# Patient Record
Sex: MALE | Race: WHITE | Employment: FULL TIME | URBAN - METROPOLITAN AREA
[De-identification: names, ages, dates, MRNs, and addresses within clinical notes are randomized per-mention and may not be internally consistent; named-entity substitution may affect disease eponyms.]

---

## 2019-01-17 ENCOUNTER — OFFICE VISIT (OUTPATIENT)
Dept: FAMILY MEDICINE CLINIC | Facility: CLINIC | Age: 39
End: 2019-01-17
Payer: COMMERCIAL

## 2019-01-17 VITALS
HEART RATE: 77 BPM | TEMPERATURE: 98 F | WEIGHT: 181 LBS | BODY MASS INDEX: 24.52 KG/M2 | DIASTOLIC BLOOD PRESSURE: 64 MMHG | HEIGHT: 72 IN | OXYGEN SATURATION: 99 % | SYSTOLIC BLOOD PRESSURE: 100 MMHG

## 2019-01-17 DIAGNOSIS — R05.3 CHRONIC COUGH: Primary | ICD-10-CM

## 2019-01-17 DIAGNOSIS — J98.01 POST-INFECTION BRONCHOSPASM: ICD-10-CM

## 2019-01-17 PROCEDURE — 99204 OFFICE O/P NEW MOD 45 MIN: CPT | Performed by: FAMILY MEDICINE

## 2019-01-17 RX ORDER — CEFDINIR 300 MG/1
CAPSULE ORAL
Refills: 0 | COMMUNITY
Start: 2019-01-10 | End: 2019-02-12 | Stop reason: ALTCHOICE

## 2019-01-17 NOTE — PROGRESS NOTES
HPI:   Patient presents with:  Cough: cough x12 weeks      Tye Ernandez is a 41year old male presenting for:    Cough  -for past 3month  -works as   -started getting GUALLPA and cough that initial month  -went to [address] and had normal AST, ALT, BILT, TSH, T4F       Medications:    Current Outpatient Medications:  cefdinir 300 MG Oral Cap TK 1 C PO BID FOR 10 DAYS Disp:  Rfl: 0      History reviewed. No pertinent past medical history. History reviewed. No pertinent surgical history. this encounter.       Imaging & Consults:  None    Health Maintenance:  Annual Physical due on 12/17/1983  Annual Depression Screen due on 12/17/1993  Influenza Vaccine(1) due on 09/01/2018      Vinicio Clements MD

## 2019-01-21 NOTE — PROGRESS NOTES
HPI:   Patient presents with:  Cough: cough x12 weeks      Carisa Cabezas is a 41year old male presenting for:  Cough  -for past 3month  -works as   -started getting GUALLPA and cough went to [address] and had normal CXR 2mo ago and told it Specimen A: Received in formalin, is a piece of skin, measuring 8 mm. Bisected and entirely submitted.  (TS-2-1)              Labs:   No results found for: A1C   No results found for: CHOLEST, HDL, TRIG, LDL, NONHDLC    No results found for: GLU, NA, K, CL, 3 Encounters:  01/17/19 : 181 lb      Physical Exam   Constitutional: He appears well-developed and well-nourished. No distress. HENT:   Nose: Nose normal. Right sinus exhibits no maxillary sinus tenderness and no frontal sinus tenderness.  Left sinus exh any questions, complications, allergies, or worsening or changing symptoms as well as any side effects or complications from the treatments . Red flags/ ER precautions discussed.     Meds & Refills for this Visit:  Requested Prescriptions      No prescript

## 2019-02-04 ENCOUNTER — TELEPHONE (OUTPATIENT)
Dept: FAMILY MEDICINE CLINIC | Facility: CLINIC | Age: 39
End: 2019-02-04

## 2019-02-04 NOTE — TELEPHONE ENCOUNTER
Patient is calling asking for orders for blood work. He wants to get a thyroid,cbc,cmp,vitamin D, and Cholestrol.

## 2019-02-05 ENCOUNTER — TELEPHONE (OUTPATIENT)
Dept: FAMILY MEDICINE CLINIC | Facility: CLINIC | Age: 39
End: 2019-02-05

## 2019-02-05 NOTE — TELEPHONE ENCOUNTER
Called pt yesterday no answer see telephone encounter from 02/04/2019    Called pt informed him he needs an appointment, appt scheduled

## 2019-02-05 NOTE — TELEPHONE ENCOUNTER
Patient is requesting orders for lab work, patient states he spoke to someone and left message but no one call him back.  Please advise

## 2019-02-12 PROCEDURE — 87186 SC STD MICRODIL/AGAR DIL: CPT | Performed by: OTOLARYNGOLOGY

## 2019-02-12 PROCEDURE — 87070 CULTURE OTHR SPECIMN AEROBIC: CPT | Performed by: OTOLARYNGOLOGY

## 2019-02-12 PROCEDURE — 87205 SMEAR GRAM STAIN: CPT | Performed by: OTOLARYNGOLOGY

## 2019-02-12 PROCEDURE — 87077 CULTURE AEROBIC IDENTIFY: CPT | Performed by: OTOLARYNGOLOGY

## 2019-02-13 ENCOUNTER — OFFICE VISIT (OUTPATIENT)
Dept: FAMILY MEDICINE CLINIC | Facility: CLINIC | Age: 39
End: 2019-02-13
Payer: COMMERCIAL

## 2019-02-13 VITALS
BODY MASS INDEX: 24.11 KG/M2 | RESPIRATION RATE: 13 BRPM | SYSTOLIC BLOOD PRESSURE: 124 MMHG | OXYGEN SATURATION: 98 % | WEIGHT: 178 LBS | HEIGHT: 72 IN | DIASTOLIC BLOOD PRESSURE: 80 MMHG | HEART RATE: 76 BPM

## 2019-02-13 DIAGNOSIS — Z13.6 SCREENING FOR CARDIOVASCULAR CONDITION: ICD-10-CM

## 2019-02-13 DIAGNOSIS — Z00.00 HEALTHCARE MAINTENANCE: ICD-10-CM

## 2019-02-13 DIAGNOSIS — Z00.00 WELLNESS EXAMINATION: Primary | ICD-10-CM

## 2019-02-13 DIAGNOSIS — R53.83 FATIGUE, UNSPECIFIED TYPE: ICD-10-CM

## 2019-02-13 PROCEDURE — 99395 PREV VISIT EST AGE 18-39: CPT | Performed by: FAMILY MEDICINE

## 2019-02-13 PROCEDURE — 36415 COLL VENOUS BLD VENIPUNCTURE: CPT | Performed by: FAMILY MEDICINE

## 2019-02-13 NOTE — PROGRESS NOTES
CC: Annual Physical Exam     HPI:   Larry Godinez is a 41year old male who presents for a complete physical exam.     HCM  -Diet: Well-balanced.   -Exercise: regularly  -Mental Health: denies any depression or anxiety sx  -Skin care:  no concerning lesio (calc)    ALBUMIN/GLOBULIN RATIO 1.8 1.0 - 2.5 (calc)    BILIRUBIN, TOTAL 0.5 0.2 - 1.2 mg/dL    ALKALINE PHOSPHATASE 57 40 - 115 U/L    AST 27 10 - 40 U/L    ALT 29 9 - 46 U/L   LIPID PANEL   Result Value Ref Range    CHOLESTEROL, TOTAL 184 <200 mg/dL Mother    • Hypertension Mother    • High Cholesterol Mother    • Other (rheumatic fever) Mother    • Other (mitral prolapse) Mother    • Heart Attack Father 64   • Other (tia) Father    • Hypertension Maternal Grandmother    • Depression Maternal Grandmot Pulse 76   Resp 13   Ht 72\"   Wt 178 lb   SpO2 98%   BMI 24.14 kg/m²  Estimated body mass index is 24.14 kg/m² as calculated from the following:    Height as of this encounter: 72\". Weight as of this encounter: 178 lb. Vital signs reviewed. Appears LIPID PANEL  - TSH W REFLEX TO FREE T4  - URINALYSIS, ROUTINE  - VITAMIN D, 25-HYDROXY      Annual Physical due on 12/17/1983  Annual Depression Screen due on 12/17/1993  Influenza Vaccine(1) due on 09/01/2018      The patient indicates understanding of th

## 2019-02-13 NOTE — PROGRESS NOTES
VITAMIN D,UA,TSH,LIPID,CMP,and CBC labs drawn per Dr Spaulding President orders, Pt tolerated lab draw well

## 2019-02-14 LAB
ALBUMIN/GLOBULIN RATIO: 1.8 (CALC) (ref 1–2.5)
ALBUMIN: 4.8 G/DL (ref 3.6–5.1)
ALKALINE PHOSPHATASE: 57 U/L (ref 40–115)
ALT: 29 U/L (ref 9–46)
APPEARANCE: CLEAR
AST: 27 U/L (ref 10–40)
BILIRUBIN, TOTAL: 0.5 MG/DL (ref 0.2–1.2)
BILIRUBIN: NEGATIVE
BUN: 21 MG/DL (ref 7–25)
CALCIUM: 9.5 MG/DL (ref 8.6–10.3)
CARBON DIOXIDE: 26 MMOL/L (ref 20–32)
CHLORIDE: 101 MMOL/L (ref 98–110)
CHOL/HDLC RATIO: 2.7 (CALC)
CHOLESTEROL, TOTAL: 184 MG/DL
COLOR: YELLOW
CREATININE: 1.25 MG/DL (ref 0.6–1.35)
EGFR IF AFRICN AM: 85 ML/MIN/1.73M2
EGFR IF NONAFRICN AM: 73 ML/MIN/1.73M2
GLOBULIN: 2.6 G/DL (CALC) (ref 1.9–3.7)
GLUCOSE: 86 MG/DL (ref 65–99)
GLUCOSE: NEGATIVE
HDL CHOLESTEROL: 69 MG/DL
HEMATOCRIT: 40 % (ref 38.5–50)
HEMOGLOBIN: 13.6 G/DL (ref 13.2–17.1)
KETONES: NEGATIVE
LDL-CHOLESTEROL: 103 MG/DL (CALC)
LEUKOCYTE ESTERASE: NEGATIVE
MCH: 30.5 PG (ref 27–33)
MCHC: 34 G/DL (ref 32–36)
MCV: 89.7 FL (ref 80–100)
MPV: 10 FL (ref 7.5–12.5)
NITRITE: NEGATIVE
NON-HDL CHOLESTEROL: 115 MG/DL (CALC)
OCCULT BLOOD: NEGATIVE
PH: 6 (ref 5–8)
PLATELET COUNT: 341 THOUSAND/UL (ref 140–400)
POTASSIUM: 3.9 MMOL/L (ref 3.5–5.3)
PROTEIN, TOTAL: 7.4 G/DL (ref 6.1–8.1)
PROTEIN: NEGATIVE
RDW: 12.9 % (ref 11–15)
RED BLOOD CELL COUNT: 4.46 MILLION/UL (ref 4.2–5.8)
SODIUM: 137 MMOL/L (ref 135–146)
SPECIFIC GRAVITY: 1.03 (ref 1–1.03)
TRIGLYCERIDES: 41 MG/DL
TSH W/REFLEX TO FT4: 1.6 MIU/L (ref 0.4–4.5)
VITAMIN D, 25-OH, TOTAL: 62 NG/ML (ref 30–100)
WHITE BLOOD CELL COUNT: 11.9 THOUSAND/UL (ref 3.8–10.8)

## 2019-02-18 ENCOUNTER — TELEPHONE (OUTPATIENT)
Dept: FAMILY MEDICINE CLINIC | Facility: CLINIC | Age: 39
End: 2019-02-18

## 2019-02-18 NOTE — TELEPHONE ENCOUNTER
----- Message from Jossue Ge MD sent at 2/15/2019  1:47 PM CST -----  Please let him know that his labs were overall normal  -Mild WBC elevation but that's due to his sinusitis.  Otherwise his CBC was normal  -CMP with normal electrolytes, li

## 2019-02-18 NOTE — TELEPHONE ENCOUNTER
Pt returned call,  verified, Results below given  Pt requested results be mailed to home address on file  Results mailed

## 2019-03-22 ENCOUNTER — OFFICE VISIT (OUTPATIENT)
Dept: FAMILY MEDICINE CLINIC | Facility: CLINIC | Age: 39
End: 2019-03-22
Payer: COMMERCIAL

## 2019-03-22 VITALS
HEART RATE: 85 BPM | DIASTOLIC BLOOD PRESSURE: 72 MMHG | BODY MASS INDEX: 24.38 KG/M2 | TEMPERATURE: 99 F | WEIGHT: 180 LBS | HEIGHT: 72 IN | SYSTOLIC BLOOD PRESSURE: 118 MMHG | OXYGEN SATURATION: 97 %

## 2019-03-22 DIAGNOSIS — J02.0 STREP PHARYNGITIS: Primary | ICD-10-CM

## 2019-03-22 LAB
CONTROL LINE PRESENT WITH A CLEAR BACKGROUND (YES/NO): YES YES/NO
STREP GRP A CUL-SCR: POSITIVE

## 2019-03-22 PROCEDURE — 87880 STREP A ASSAY W/OPTIC: CPT | Performed by: FAMILY MEDICINE

## 2019-03-22 PROCEDURE — 99213 OFFICE O/P EST LOW 20 MIN: CPT | Performed by: FAMILY MEDICINE

## 2019-03-22 RX ORDER — AMOXICILLIN 500 MG/1
500 CAPSULE ORAL 2 TIMES DAILY
Qty: 20 CAPSULE | Refills: 0 | Status: SHIPPED | OUTPATIENT
Start: 2019-03-22 | End: 2019-04-01

## 2019-03-24 NOTE — PROGRESS NOTES
HPI:   Patient presents with:  URI: sore throat, body aches, slight fever since last night pt states his son has strep      Ashwin Jack is a 41year old male presenting for:    Sore throat  -for past 2d having sore throat and subjective f/c  -son dx w caused             reaction.   Meperidine              OTHER (SEE COMMENTS)   Social History:  Social History    Tobacco Use      Smoking status: Never Smoker      Smokeless tobacco: Never Used    Alcohol use: No      Frequency: Never    Drug use: Not on fi from Last 3 Encounters:  03/22/19 : 180 lb  02/13/19 : 178 lb  01/23/19 : 174 lb      Physical Exam   Constitutional: He appears well-developed and well-nourished. No distress.    HENT:   Mouth/Throat: Oropharyngeal exudate, posterior oropharyngeal edema (m this Visit:  Requested Prescriptions     Signed Prescriptions Disp Refills   • amoxicillin 500 MG Oral Cap 20 capsule 0     Sig: Take 1 capsule (500 mg total) by mouth 2 (two) times daily for 10 days.        Orders Placed This Encounter      POC Rapid Strep

## 2019-04-16 ENCOUNTER — OFFICE VISIT (OUTPATIENT)
Dept: FAMILY MEDICINE CLINIC | Facility: CLINIC | Age: 39
End: 2019-04-16
Payer: COMMERCIAL

## 2019-04-16 VITALS
DIASTOLIC BLOOD PRESSURE: 78 MMHG | OXYGEN SATURATION: 98 % | WEIGHT: 182 LBS | HEIGHT: 72 IN | RESPIRATION RATE: 12 BRPM | HEART RATE: 75 BPM | SYSTOLIC BLOOD PRESSURE: 114 MMHG | BODY MASS INDEX: 24.65 KG/M2

## 2019-04-16 DIAGNOSIS — L30.9 DERMATITIS: Primary | ICD-10-CM

## 2019-04-16 PROCEDURE — 99213 OFFICE O/P EST LOW 20 MIN: CPT | Performed by: FAMILY MEDICINE

## 2019-04-16 RX ORDER — CLOTRIMAZOLE AND BETAMETHASONE DIPROPIONATE 10; .64 MG/G; MG/G
1 CREAM TOPICAL 2 TIMES DAILY
Qty: 15 G | Refills: 0 | Status: SHIPPED | OUTPATIENT
Start: 2019-04-16 | End: 2019-12-05

## 2019-04-16 NOTE — PROGRESS NOTES
HPI:   Patient presents with:  Derm Problem: bilateral thigh rash x3 weeks      Dorothy Wolfe is a 41year old male presenting for:    Rash  -for past 2-3 wks having rash in inner thigh on b/l leg  -not spreading  -not itchy, burning, painful  -putting reaction but             never knew which one was the one that caused             reaction.   Meperidine              OTHER (SEE COMMENTS)   Social History:  Social History    Tobacco Use      Smoking status: Never Smoker      Smokeless tobacco: Never Used : 182 lb  03/22/19 : 180 lb  02/13/19 : 178 lb      Physical Exam   Constitutional: He appears well-developed and well-nourished. No distress. HENT:   Mouth/Throat: Oropharynx is clear and moist.   Eyes: Pupils are equal, round, and reactive to light.  Co Cristin Mccabe MD

## 2019-12-05 ENCOUNTER — OFFICE VISIT (OUTPATIENT)
Dept: FAMILY MEDICINE CLINIC | Facility: CLINIC | Age: 39
End: 2019-12-05
Payer: COMMERCIAL

## 2019-12-05 ENCOUNTER — TELEPHONE (OUTPATIENT)
Dept: FAMILY MEDICINE CLINIC | Facility: CLINIC | Age: 39
End: 2019-12-05

## 2019-12-05 VITALS
DIASTOLIC BLOOD PRESSURE: 64 MMHG | OXYGEN SATURATION: 98 % | BODY MASS INDEX: 24.92 KG/M2 | WEIGHT: 184 LBS | RESPIRATION RATE: 13 BRPM | HEIGHT: 72 IN | HEART RATE: 74 BPM | SYSTOLIC BLOOD PRESSURE: 100 MMHG

## 2019-12-05 DIAGNOSIS — N48.89 PENILE IRRITATION: Primary | ICD-10-CM

## 2019-12-05 PROCEDURE — 99213 OFFICE O/P EST LOW 20 MIN: CPT | Performed by: FAMILY MEDICINE

## 2019-12-05 PROCEDURE — 81003 URINALYSIS AUTO W/O SCOPE: CPT | Performed by: FAMILY MEDICINE

## 2019-12-05 RX ORDER — CLOTRIMAZOLE 1 %
1 CREAM (GRAM) TOPICAL 2 TIMES DAILY
Qty: 14 G | Refills: 0 | Status: SHIPPED | OUTPATIENT
Start: 2019-12-05 | End: 2021-02-15 | Stop reason: ALTCHOICE

## 2019-12-05 NOTE — TELEPHONE ENCOUNTER
Open slot available with Dr. Jaxson Barton today around noon. Called and left vm for patient to call office back to see if he would like to take the open slot.

## 2019-12-05 NOTE — PROGRESS NOTES
HPI:   Patient presents with:  Pain: pain in genital area since September      Donna Fitzgeradl is a 46year old male presenting for: For the past 3mo having pain in penis.  No rashes, skin changes, swelling, discharge, bulging  Years ago had \"sprained 02/13/2019 01:33 PM          Medications:  Current Outpatient Medications   Medication Sig Dispense Refill   • clotrimazole 1 % External Cream Apply 1 drop topically 2 (two) times daily. 14 g 0      No past medical history on file.       Past Surgical Histo shortness of breath. Cardiovascular: Negative for chest pain, palpitations and leg swelling. Gastrointestinal: Negative for vomiting, abdominal pain, diarrhea and constipation. Genitourinary: Positive for penile pain.  Negative for discharge, penile Patient is a 46year old male who presents primarily presents for:    Diagnoses and all orders for this visit:    Penile irritation  -     URINALYSIS, AUTO, W/O SCOPE  -     CHLAMYDIA/GONOCOCCUS, JOBY  -     clotrimazole 1 % External Cream; Apply 1 drop t

## 2019-12-05 NOTE — TELEPHONE ENCOUNTER
Pt called requesting appt for today or tomorrow, he stated that he has pain in his pinus and would like to get that checked or if he needs to see a urologist to please recommend one.   Please call and advise

## 2019-12-10 ENCOUNTER — TELEPHONE (OUTPATIENT)
Dept: FAMILY MEDICINE CLINIC | Facility: CLINIC | Age: 39
End: 2019-12-10

## 2019-12-10 NOTE — TELEPHONE ENCOUNTER
----- Message from Chris Shearer MD sent at 12/6/2019  2:33 PM CST -----  Please let him know that the test was neg

## 2020-03-16 ENCOUNTER — TELEPHONE (OUTPATIENT)
Dept: FAMILY MEDICINE CLINIC | Facility: CLINIC | Age: 40
End: 2020-03-16

## 2020-03-16 NOTE — TELEPHONE ENCOUNTER
Spoke to patient to discuss symptoms. 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 Cough started last night, more of dry cough.   C/o pressure on chest, feels weight a

## 2020-03-16 NOTE — TELEPHONE ENCOUNTER
Pt called stating that since yesterday he has occasional cough/dry cough, felt tiered and has been feeling like pressure in his chest and has problem breathing. Pt has no temp or sore throat. Has not traveled in the past couple of months.   Please call and

## 2020-03-16 NOTE — TELEPHONE ENCOUNTER
Left detailed voicemail. Advised patient to obtain OTC medications such as Mucinex D or DM, or Robitussin DM, Loratadine D and Flonase to help his symptoms.   If having trouble obtaining these medications to call office back and we will send a RX for him

## 2020-03-19 ENCOUNTER — TELEPHONE (OUTPATIENT)
Dept: INTERNAL MEDICINE CLINIC | Facility: CLINIC | Age: 40
End: 2020-03-19

## 2020-03-19 NOTE — TELEPHONE ENCOUNTER
Pt called c/o chest pressure- constant for a few days. Nonexertional. No fevers. No cough. ? Anxiety, closed on a house today. No cardiac risk factors. D/w pt could be muscle/ vs gerd/ vs pleuritic. If CP or SOB or fevers, call us back.   Otherwise stay h

## 2020-07-13 ENCOUNTER — TELEPHONE (OUTPATIENT)
Dept: FAMILY MEDICINE CLINIC | Facility: CLINIC | Age: 40
End: 2020-07-13

## 2020-07-13 NOTE — TELEPHONE ENCOUNTER
Patient is calling stating he has a bump that Is forming on his right tricep. Wants to know if he should make an candie to be check with Dr. Jose Love or dermatologist .please advice.

## 2020-07-13 NOTE — TELEPHONE ENCOUNTER
Spoke to patient. Symptoms began about a week ago. Right arm, tricep -- feels that there is a bump under the skin. C/o mild pain, and \"little irritated\" -- denies redness or warm to touch.   Reports that there is \"no head\" -- thus essentially nothin

## 2021-02-01 ENCOUNTER — TELEPHONE (OUTPATIENT)
Dept: FAMILY MEDICINE CLINIC | Facility: CLINIC | Age: 41
End: 2021-02-01

## 2021-02-01 NOTE — TELEPHONE ENCOUNTER
On call note    Pt reports having chest pain intermittently for past couple of weeks. Sometimes associated with nausea, palpitations. No SOB  Has been more anxious/stressed lately due to [place].  Has had confrontations at work with customers/clients refusi

## 2021-02-03 ENCOUNTER — ORDER TRANSCRIPTION (OUTPATIENT)
Dept: ADMINISTRATIVE | Facility: HOSPITAL | Age: 41
End: 2021-02-03

## 2021-02-03 ENCOUNTER — TELEPHONE (OUTPATIENT)
Dept: INTERNAL MEDICINE CLINIC | Facility: CLINIC | Age: 41
End: 2021-02-03

## 2021-02-03 DIAGNOSIS — Z13.9 ENCOUNTER FOR SCREENING: Primary | ICD-10-CM

## 2021-02-03 NOTE — TELEPHONE ENCOUNTER
Spoke to patient, I informed him per Dr Lissette Mota notes he will be having an EKG and labs done when he comes in on 02/15/2021.  Per patient he wants to have a heart scan done at [facility] he states its is $75, he will call if he needs an order to be entered,

## 2021-02-11 ENCOUNTER — HOSPITAL ENCOUNTER (OUTPATIENT)
Dept: CT IMAGING | Age: 41
Discharge: HOME | End: 2021-02-11
Attending: FAMILY MEDICINE

## 2021-02-11 DIAGNOSIS — Z13.9 ENCOUNTER FOR SCREENING: ICD-10-CM

## 2021-02-15 ENCOUNTER — OFFICE VISIT (OUTPATIENT)
Dept: FAMILY MEDICINE CLINIC | Facility: CLINIC | Age: 41
End: 2021-02-15
Payer: COMMERCIAL

## 2021-02-15 ENCOUNTER — LAB ENCOUNTER (OUTPATIENT)
Dept: LAB | Facility: HOSPITAL | Age: 41
End: 2021-02-15
Attending: FAMILY MEDICINE
Payer: COMMERCIAL

## 2021-02-15 VITALS
HEART RATE: 85 BPM | DIASTOLIC BLOOD PRESSURE: 68 MMHG | BODY MASS INDEX: 24.92 KG/M2 | HEIGHT: 72 IN | WEIGHT: 184 LBS | SYSTOLIC BLOOD PRESSURE: 110 MMHG | OXYGEN SATURATION: 98 %

## 2021-02-15 DIAGNOSIS — Z00.00 HEALTHCARE MAINTENANCE: ICD-10-CM

## 2021-02-15 DIAGNOSIS — R00.2 PALPITATION: ICD-10-CM

## 2021-02-15 DIAGNOSIS — R00.2 PALPITATION: Primary | ICD-10-CM

## 2021-02-15 DIAGNOSIS — S76.212A INGUINAL STRAIN, LEFT, INITIAL ENCOUNTER: ICD-10-CM

## 2021-02-15 PROCEDURE — 3008F BODY MASS INDEX DOCD: CPT | Performed by: FAMILY MEDICINE

## 2021-02-15 PROCEDURE — 3078F DIAST BP <80 MM HG: CPT | Performed by: FAMILY MEDICINE

## 2021-02-15 PROCEDURE — 36415 COLL VENOUS BLD VENIPUNCTURE: CPT | Performed by: FAMILY MEDICINE

## 2021-02-15 PROCEDURE — 99215 OFFICE O/P EST HI 40 MIN: CPT | Performed by: FAMILY MEDICINE

## 2021-02-15 PROCEDURE — 3074F SYST BP LT 130 MM HG: CPT | Performed by: FAMILY MEDICINE

## 2021-02-15 PROCEDURE — 93010 ELECTROCARDIOGRAM REPORT: CPT | Performed by: FAMILY MEDICINE

## 2021-02-15 PROCEDURE — 93005 ELECTROCARDIOGRAM TRACING: CPT

## 2021-02-16 LAB
ABSOLUTE BASOPHILS: 33 CELLS/UL (ref 0–200)
ABSOLUTE EOSINOPHILS: 59 CELLS/UL (ref 15–500)
ABSOLUTE LYMPHOCYTES: 1898 CELLS/UL (ref 850–3900)
ABSOLUTE MONOCYTES: 559 CELLS/UL (ref 200–950)
ABSOLUTE NEUTROPHILS: 3952 CELLS/UL (ref 1500–7800)
ALBUMIN/GLOBULIN RATIO: 1.9 (CALC) (ref 1–2.5)
ALBUMIN: 4.6 G/DL (ref 3.6–5.1)
ALKALINE PHOSPHATASE: 54 U/L (ref 36–130)
ALT: 22 U/L (ref 9–46)
AST: 26 U/L (ref 10–40)
BASOPHILS: 0.5 %
BILIRUBIN, TOTAL: 0.4 MG/DL (ref 0.2–1.2)
BUN: 22 MG/DL (ref 7–25)
CALCIUM: 9.4 MG/DL (ref 8.6–10.3)
CARBON DIOXIDE: 29 MMOL/L (ref 20–32)
CHLORIDE: 104 MMOL/L (ref 98–110)
CHOL/HDLC RATIO: 3.3 (CALC)
CHOLESTEROL, TOTAL: 187 MG/DL
CREATININE: 1.16 MG/DL (ref 0.6–1.35)
EGFR IF AFRICN AM: 91 ML/MIN/1.73M2
EGFR IF NONAFRICN AM: 79 ML/MIN/1.73M2
EOSINOPHILS: 0.9 %
GLOBULIN: 2.4 G/DL (CALC) (ref 1.9–3.7)
GLUCOSE: 85 MG/DL (ref 65–99)
HDL CHOLESTEROL: 57 MG/DL
HEMATOCRIT: 39.5 % (ref 38.5–50)
HEMOGLOBIN: 13.2 G/DL (ref 13.2–17.1)
LDL-CHOLESTEROL: 115 MG/DL (CALC)
LYMPHOCYTES: 29.2 %
MCH: 30.4 PG (ref 27–33)
MCHC: 33.4 G/DL (ref 32–36)
MCV: 91 FL (ref 80–100)
MONOCYTES: 8.6 %
MPV: 9.9 FL (ref 7.5–12.5)
NEUTROPHILS: 60.8 %
NON-HDL CHOLESTEROL: 130 MG/DL (CALC)
PLATELET COUNT: 271 THOUSAND/UL (ref 140–400)
POTASSIUM: 3.9 MMOL/L (ref 3.5–5.3)
PROTEIN, TOTAL: 7 G/DL (ref 6.1–8.1)
RDW: 11.4 % (ref 11–15)
RED BLOOD CELL COUNT: 4.34 MILLION/UL (ref 4.2–5.8)
SODIUM: 141 MMOL/L (ref 135–146)
TRIGLYCERIDES: 49 MG/DL
TSH W/REFLEX TO FT4: 1.71 MIU/L (ref 0.4–4.5)
WHITE BLOOD CELL COUNT: 6.5 THOUSAND/UL (ref 3.8–10.8)

## 2021-02-17 ENCOUNTER — TELEPHONE (OUTPATIENT)
Dept: FAMILY MEDICINE CLINIC | Facility: CLINIC | Age: 41
End: 2021-02-17

## 2021-02-17 NOTE — TELEPHONE ENCOUNTER
----- Message from Aure Troncoso MD sent at 2/16/2021  8:30 AM CST -----  Please let him know his bloodwork (thryoid, glucose, blood count, kidney, liver, electrolytes) and EKG are normal  Your cholesterol panel is minimally elevated.  This is re

## 2021-02-20 NOTE — PROGRESS NOTES
HPI:   Patient presents with:  Chest Pain      Jo Pineda is a 45year old male presenting for:    chest pain intermittently for past couple of weeks. Sometimes associated with nausea, palpitations.   No SOB  Has been more anxious/stressed lately due (calc)    ALBUMIN/GLOBULIN RATIO 1.9 1.0 - 2.5 (calc)    BILIRUBIN, TOTAL 0.4 0.2 - 1.2 mg/dL    ALKALINE PHOSPHATASE 54 36 - 130 U/L    AST 26 10 - 40 U/L    ALT 22 9 - 46 U/L   LIPID PANEL   Result Value Ref Range    CHOLESTEROL, TOTAL 187 <200 mg/dL Never Used    Alcohol use: No      Frequency: Never    Drug use: Not on file     Family History:  Family History   Problem Relation Age of Onset   • Thyroid disease Mother    • Hypertension Mother    • High Cholesterol Mother    • Other (rheumatic fever) M kg)  12/05/19 : 184 lb (83.5 kg)  04/16/19 : 182 lb (82.6 kg)      Physical Exam   Vitals reviewed. Constitutional: He appears well-developed. No distress.    Eyes: Conjunctivae are normal.   Cardiovascular: Normal rate, regular rhythm and normal heart so complications from the treatments . Red flags/ ER precautions discussed.     Spent 40 minutes including chart review, reviewing appropriate medical history, evaluating patient, discussing treatment options, counseling and education  ordering appropriate di

## 2021-06-01 ENCOUNTER — TELEPHONE (OUTPATIENT)
Dept: INTERNAL MEDICINE CLINIC | Facility: CLINIC | Age: 41
End: 2021-06-01

## 2021-06-02 ENCOUNTER — APPOINTMENT (OUTPATIENT)
Dept: ULTRASOUND IMAGING | Facility: HOSPITAL | Age: 41
End: 2021-06-02
Attending: EMERGENCY MEDICINE
Payer: COMMERCIAL

## 2021-06-02 ENCOUNTER — HOSPITAL ENCOUNTER (EMERGENCY)
Facility: HOSPITAL | Age: 41
Discharge: HOME | End: 2021-06-02
Attending: EMERGENCY MEDICINE
Payer: COMMERCIAL

## 2021-06-02 ENCOUNTER — APPOINTMENT (OUTPATIENT)
Dept: MRI IMAGING | Facility: HOSPITAL | Age: 41
End: 2021-06-02
Attending: EMERGENCY MEDICINE
Payer: COMMERCIAL

## 2021-06-02 VITALS
HEART RATE: 70 BPM | TEMPERATURE: 97 F | SYSTOLIC BLOOD PRESSURE: 118 MMHG | HEIGHT: 72 IN | WEIGHT: 180 LBS | DIASTOLIC BLOOD PRESSURE: 78 MMHG | RESPIRATION RATE: 16 BRPM | OXYGEN SATURATION: 97 % | BODY MASS INDEX: 24.38 KG/M2

## 2021-06-02 DIAGNOSIS — R10.9 ABDOMINAL PAIN OF UNKNOWN ETIOLOGY: Primary | ICD-10-CM

## 2021-06-02 PROCEDURE — 85025 COMPLETE CBC W/AUTO DIFF WBC: CPT

## 2021-06-02 PROCEDURE — 76705 ECHO EXAM OF ABDOMEN: CPT | Performed by: EMERGENCY MEDICINE

## 2021-06-02 PROCEDURE — 81003 URINALYSIS AUTO W/O SCOPE: CPT | Performed by: EMERGENCY MEDICINE

## 2021-06-02 PROCEDURE — 72195 MRI PELVIS W/O DYE: CPT | Performed by: EMERGENCY MEDICINE

## 2021-06-02 PROCEDURE — 83690 ASSAY OF LIPASE: CPT | Performed by: EMERGENCY MEDICINE

## 2021-06-02 PROCEDURE — 80076 HEPATIC FUNCTION PANEL: CPT | Performed by: EMERGENCY MEDICINE

## 2021-06-02 PROCEDURE — 96361 HYDRATE IV INFUSION ADD-ON: CPT

## 2021-06-02 PROCEDURE — 81003 URINALYSIS AUTO W/O SCOPE: CPT

## 2021-06-02 PROCEDURE — 85025 COMPLETE CBC W/AUTO DIFF WBC: CPT | Performed by: EMERGENCY MEDICINE

## 2021-06-02 PROCEDURE — 80048 BASIC METABOLIC PNL TOTAL CA: CPT | Performed by: EMERGENCY MEDICINE

## 2021-06-02 PROCEDURE — 80048 BASIC METABOLIC PNL TOTAL CA: CPT

## 2021-06-02 PROCEDURE — 99285 EMERGENCY DEPT VISIT HI MDM: CPT

## 2021-06-02 PROCEDURE — 96360 HYDRATION IV INFUSION INIT: CPT

## 2021-06-02 RX ORDER — DICYCLOMINE HCL 20 MG
20 TABLET ORAL 4 TIMES DAILY PRN
Qty: 30 TABLET | Refills: 0 | Status: SHIPPED | OUTPATIENT
Start: 2021-06-02 | End: 2021-07-02

## 2021-06-02 NOTE — TELEPHONE ENCOUNTER
Pt called. C/o some pain periumbilical/ RLQ x 6 days. Sometimes dull ache, gets up to 5/10 at most. No sharp pains. No vomiting. No nausea. Appetite ok. Very mild loose stools but also possible constipation.  Today also noted some R low back pain but was pl

## 2021-06-02 NOTE — ED INITIAL ASSESSMENT (HPI)
Patient concerned for appendicitis. He requests if he does need imaging to avoid CT scan and have ultrasound instead to avoid radiation exposure.

## 2021-06-03 NOTE — ED QUICK NOTES
Discharge instructions reviewed with pt. Pt denies any further questions at this time. Pt stable with a steady gait at time of discharge.

## 2021-06-03 NOTE — ED PROVIDER NOTES
Patient Seen in: [facility] AND [facility] Emergency Department      History   Patient presents with:  Abdomen/Flank Pain    Stated Complaint:     HPI/Subjective:   HPI    Patient presents to the emergency department complaining of right-sided periumbilical abdo Pulmonary:      Effort: Pulmonary effort is normal. No respiratory distress. Breath sounds: Normal breath sounds. Abdominal:      General: There is no distension. Palpations: Abdomen is soft. Tenderness:  There is abdominal tenderness i 96%, Normal,     Cardiac Monitor: Pulse Readings from Last 1 Encounters:  06/02/21 : 70  , sinus,      Radiology findings: US GALLBLADDER (AVC=[id number])    Result Date: 6/2/2021  CONCLUSION:  1. Normal gallbladder ultrasound.     Dictated by (CST): Westley Baxter

## 2021-06-03 NOTE — TELEPHONE ENCOUNTER
Left voicemail for patient. Calling to follow-up on his condition. To call office back to discuss and/or make an appointment.

## 2021-06-30 ENCOUNTER — HOSPITAL ENCOUNTER (EMERGENCY)
Facility: HOSPITAL | Age: 41
Discharge: HOME | End: 2021-06-30
Attending: EMERGENCY MEDICINE
Payer: COMMERCIAL

## 2021-06-30 ENCOUNTER — APPOINTMENT (OUTPATIENT)
Dept: GENERAL RADIOLOGY | Facility: HOSPITAL | Age: 41
End: 2021-06-30
Payer: COMMERCIAL

## 2021-06-30 VITALS
OXYGEN SATURATION: 97 % | HEIGHT: 72 IN | BODY MASS INDEX: 24.38 KG/M2 | RESPIRATION RATE: 15 BRPM | SYSTOLIC BLOOD PRESSURE: 107 MMHG | DIASTOLIC BLOOD PRESSURE: 66 MMHG | HEART RATE: 61 BPM | WEIGHT: 180 LBS

## 2021-06-30 DIAGNOSIS — R07.89 CHEST PAIN, ATYPICAL: ICD-10-CM

## 2021-06-30 DIAGNOSIS — R07.89 CHEST WALL PAIN: Primary | ICD-10-CM

## 2021-06-30 DIAGNOSIS — R00.2 PALPITATIONS: ICD-10-CM

## 2021-06-30 LAB
ANION GAP SERPL CALC-SCNC: 5 MMOL/L (ref 0–18)
BASOPHILS # BLD AUTO: 0.02 X10(3) UL (ref 0–0.2)
BASOPHILS NFR BLD AUTO: 0.3 %
BUN BLD-MCNC: 22 MG/DL (ref 7–18)
BUN/CREAT SERPL: 20 (ref 10–20)
CALCIUM BLD-MCNC: 8.9 MG/DL (ref 8.5–10.1)
CHLORIDE SERPL-SCNC: 106 MMOL/L (ref 98–112)
CO2 SERPL-SCNC: 31 MMOL/L (ref 21–32)
CREAT BLD-MCNC: 1.1 MG/DL
D DIMER PPP FEU-MCNC: <0.27 UG/ML FEU (ref ?–0.5)
DEPRECATED RDW RBC AUTO: 41.2 FL (ref 35.1–46.3)
EOSINOPHIL # BLD AUTO: 0.1 X10(3) UL (ref 0–0.7)
EOSINOPHIL NFR BLD AUTO: 1.4 %
ERYTHROCYTE [DISTWIDTH] IN BLOOD BY AUTOMATED COUNT: 12 % (ref 11–15)
GLUCOSE BLD-MCNC: 103 MG/DL (ref 70–99)
HCT VFR BLD AUTO: 43 %
HGB BLD-MCNC: 14 G/DL
IMM GRANULOCYTES # BLD AUTO: 0.02 X10(3) UL (ref 0–1)
IMM GRANULOCYTES NFR BLD: 0.3 %
LYMPHOCYTES # BLD AUTO: 2.18 X10(3) UL (ref 1–4)
LYMPHOCYTES NFR BLD AUTO: 29.5 %
MCH RBC QN AUTO: 30.3 PG (ref 26–34)
MCHC RBC AUTO-ENTMCNC: 32.6 G/DL (ref 31–37)
MCV RBC AUTO: 93.1 FL
MONOCYTES # BLD AUTO: 0.61 X10(3) UL (ref 0.1–1)
MONOCYTES NFR BLD AUTO: 8.2 %
NEUTROPHILS # BLD AUTO: 4.47 X10 (3) UL (ref 1.5–7.7)
NEUTROPHILS # BLD AUTO: 4.47 X10(3) UL (ref 1.5–7.7)
NEUTROPHILS NFR BLD AUTO: 60.3 %
OSMOLALITY SERPL CALC.SUM OF ELEC: 298 MOSM/KG (ref 275–295)
PLATELET # BLD AUTO: 307 10(3)UL (ref 150–450)
POTASSIUM SERPL-SCNC: 3.8 MMOL/L (ref 3.5–5.1)
RBC # BLD AUTO: 4.62 X10(6)UL
SODIUM SERPL-SCNC: 142 MMOL/L (ref 136–145)
TROPONIN I SERPL-MCNC: <0.045 NG/ML (ref ?–0.04)
WBC # BLD AUTO: 7.4 X10(3) UL (ref 4–11)

## 2021-06-30 PROCEDURE — 36415 COLL VENOUS BLD VENIPUNCTURE: CPT

## 2021-06-30 PROCEDURE — 85025 COMPLETE CBC W/AUTO DIFF WBC: CPT | Performed by: EMERGENCY MEDICINE

## 2021-06-30 PROCEDURE — 84484 ASSAY OF TROPONIN QUANT: CPT | Performed by: EMERGENCY MEDICINE

## 2021-06-30 PROCEDURE — 93005 ELECTROCARDIOGRAM TRACING: CPT

## 2021-06-30 PROCEDURE — 85379 FIBRIN DEGRADATION QUANT: CPT | Performed by: EMERGENCY MEDICINE

## 2021-06-30 PROCEDURE — 71045 X-RAY EXAM CHEST 1 VIEW: CPT | Performed by: EMERGENCY MEDICINE

## 2021-06-30 PROCEDURE — 93010 ELECTROCARDIOGRAM REPORT: CPT | Performed by: EMERGENCY MEDICINE

## 2021-06-30 PROCEDURE — 99284 EMERGENCY DEPT VISIT MOD MDM: CPT

## 2021-06-30 PROCEDURE — 80048 BASIC METABOLIC PNL TOTAL CA: CPT | Performed by: EMERGENCY MEDICINE

## 2021-06-30 NOTE — ED INITIAL ASSESSMENT (HPI)
Add hydralazine 25 mg q8h prn sys >170; continue home regimen otherwise     Pt presents to ED for chest discomfort since Saturday. Pt also notes an episode of tachycardia of 140 yesterday. Pt denies shortness of breath. Pt denies fevers.

## 2021-06-30 NOTE — ED PROVIDER NOTES
Patient Seen in: [facility] AND [facility] Emergency Department      History   Patient presents with:  Chest Pain Angina    Stated Complaint: chest discomfort     HPI/Subjective:   HPI    28-year-old healthy  vasculatures evaluation of some right Normal rate, regular rhythm and intact and equal distal pulses. No noted murmur  Pulmonary/Chest: Effort normal. No respiratory distress. Clear equal breath sounds bilaterally  Abdominal: Soft. There is no tenderness. There is no guarding.    Musculoskele osseous structures appear intact      Report faxed directly to ER at 4:49 Jose Juan Roblero M.D. This report has been electronically signed and verified by the Radiologist whose name is printed above.     DD:  06/30/2021/DT:  06/30/2021           MDM

## 2021-07-01 ENCOUNTER — TELEPHONE (OUTPATIENT)
Dept: FAMILY MEDICINE CLINIC | Facility: CLINIC | Age: 41
End: 2021-07-01

## 2021-07-01 NOTE — TELEPHONE ENCOUNTER
Patient wants to speak to the nurse or to the doctor, patient is complaining of chest pain his symptoms haven't improve since he was in the ED. Please see Ed notes thank you.

## 2021-07-01 NOTE — TELEPHONE ENCOUNTER
Spoke to patient. Doing okay. Was advised to take antiinflammatories in the ED, but per pt report, he has not been doing so. He has a scheduled procedure on Thurs (EGD?).   Discussed that tests from the ER were reassuring; to try antiinflammatories in t

## 2024-01-26 ENCOUNTER — OFFICE VISIT (OUTPATIENT)
Dept: INTERNAL MEDICINE CLINIC | Facility: CLINIC | Age: 44
End: 2024-01-26
Payer: COMMERCIAL

## 2024-01-26 ENCOUNTER — LAB ENCOUNTER (OUTPATIENT)
Dept: LAB | Age: 44
End: 2024-01-26
Attending: FAMILY MEDICINE
Payer: COMMERCIAL

## 2024-01-26 VITALS
BODY MASS INDEX: 25.06 KG/M2 | OXYGEN SATURATION: 96 % | TEMPERATURE: 98 F | HEIGHT: 72 IN | DIASTOLIC BLOOD PRESSURE: 64 MMHG | SYSTOLIC BLOOD PRESSURE: 116 MMHG | RESPIRATION RATE: 17 BRPM | HEART RATE: 64 BPM | WEIGHT: 185 LBS

## 2024-01-26 DIAGNOSIS — Z80.0 FAMILY HISTORY OF COLON CANCER: ICD-10-CM

## 2024-01-26 DIAGNOSIS — Z13.29 THYROID DISORDER SCREEN: ICD-10-CM

## 2024-01-26 DIAGNOSIS — K21.9 GASTROESOPHAGEAL REFLUX DISEASE, UNSPECIFIED WHETHER ESOPHAGITIS PRESENT: Primary | ICD-10-CM

## 2024-01-26 DIAGNOSIS — N52.9 ERECTILE DISORDER: ICD-10-CM

## 2024-01-26 DIAGNOSIS — Z00.00 WELLNESS EXAMINATION: ICD-10-CM

## 2024-01-26 DIAGNOSIS — Z13.0 SCREENING FOR DEFICIENCY ANEMIA: ICD-10-CM

## 2024-01-26 DIAGNOSIS — Z13.220 LIPID SCREENING: ICD-10-CM

## 2024-01-26 DIAGNOSIS — Z13.21 ENCOUNTER FOR VITAMIN DEFICIENCY SCREENING: ICD-10-CM

## 2024-01-26 DIAGNOSIS — L85.3 DRY SKIN: ICD-10-CM

## 2024-01-26 LAB
ALBUMIN SERPL-MCNC: 3.9 G/DL (ref 3.4–5)
ALBUMIN/GLOB SERPL: 1.2 {RATIO} (ref 1–2)
ALP LIVER SERPL-CCNC: 56 U/L
ANION GAP SERPL CALC-SCNC: 4 MMOL/L (ref 0–18)
AST SERPL-CCNC: 33 U/L (ref 15–37)
BASOPHILS # BLD AUTO: 0.03 X10(3) UL (ref 0–0.2)
BASOPHILS NFR BLD AUTO: 0.5 %
BILIRUB SERPL-MCNC: 0.5 MG/DL (ref 0.1–2)
BUN BLD-MCNC: 24 MG/DL (ref 9–23)
CALCIUM BLD-MCNC: 8.4 MG/DL (ref 8.5–10.1)
CHLORIDE SERPL-SCNC: 114 MMOL/L (ref 98–112)
CHOLEST SERPL-MCNC: 158 MG/DL (ref ?–200)
CO2 SERPL-SCNC: 29 MMOL/L (ref 21–32)
CREAT BLD-MCNC: 1.23 MG/DL
EGFRCR SERPLBLD CKD-EPI 2021: 75 ML/MIN/1.73M2 (ref 60–?)
EOSINOPHIL # BLD AUTO: 0.13 X10(3) UL (ref 0–0.7)
EOSINOPHIL NFR BLD AUTO: 2.1 %
ERYTHROCYTE [DISTWIDTH] IN BLOOD BY AUTOMATED COUNT: 12.2 %
FASTING PATIENT LIPID ANSWER: YES
FASTING STATUS PATIENT QL REPORTED: YES
GLOBULIN PLAS-MCNC: 3.2 G/DL (ref 2.8–4.4)
GLUCOSE BLD-MCNC: 95 MG/DL (ref 70–99)
HCT VFR BLD AUTO: 41.7 %
HDLC SERPL-MCNC: 48 MG/DL (ref 40–59)
HGB BLD-MCNC: 13.6 G/DL
IMM GRANULOCYTES # BLD AUTO: 0 X10(3) UL (ref 0–1)
IMM GRANULOCYTES NFR BLD: 0 %
LDLC SERPL CALC-MCNC: 96 MG/DL (ref ?–100)
LYMPHOCYTES # BLD AUTO: 1.64 X10(3) UL (ref 1–4)
LYMPHOCYTES NFR BLD AUTO: 25.9 %
MCH RBC QN AUTO: 30.9 PG (ref 26–34)
MCHC RBC AUTO-ENTMCNC: 32.6 G/DL (ref 31–37)
MCV RBC AUTO: 94.8 FL
MONOCYTES # BLD AUTO: 0.59 X10(3) UL (ref 0.1–1)
MONOCYTES NFR BLD AUTO: 9.3 %
NEUTROPHILS # BLD AUTO: 3.93 X10 (3) UL (ref 1.5–7.7)
NEUTROPHILS # BLD AUTO: 3.93 X10(3) UL (ref 1.5–7.7)
NEUTROPHILS NFR BLD AUTO: 62.2 %
NONHDLC SERPL-MCNC: 110 MG/DL (ref ?–130)
OSMOLALITY SERPL CALC.SUM OF ELEC: 308 MOSM/KG (ref 275–295)
PLATELET # BLD AUTO: 288 10(3)UL (ref 150–450)
POTASSIUM SERPL-SCNC: 4.6 MMOL/L (ref 3.5–5.1)
PROT SERPL-MCNC: 7.1 G/DL (ref 6.4–8.2)
RBC # BLD AUTO: 4.4 X10(6)UL
SODIUM SERPL-SCNC: 147 MMOL/L (ref 136–145)
TESTOST SERPL-MCNC: 463.66 NG/DL
TRIGL SERPL-MCNC: 69 MG/DL (ref 30–149)
TSI SER-ACNC: 2.97 MIU/ML (ref 0.36–3.74)
VIT B12 SERPL-MCNC: 1044 PG/ML (ref 193–986)
VIT D+METAB SERPL-MCNC: 34 NG/ML (ref 30–100)
VLDLC SERPL CALC-MCNC: 11 MG/DL (ref 0–30)
WBC # BLD AUTO: 6.3 X10(3) UL (ref 4–11)

## 2024-01-26 PROCEDURE — 99396 PREV VISIT EST AGE 40-64: CPT | Performed by: FAMILY MEDICINE

## 2024-01-26 PROCEDURE — 84403 ASSAY OF TOTAL TESTOSTERONE: CPT

## 2024-01-26 PROCEDURE — 85025 COMPLETE CBC W/AUTO DIFF WBC: CPT

## 2024-01-26 PROCEDURE — 3074F SYST BP LT 130 MM HG: CPT | Performed by: FAMILY MEDICINE

## 2024-01-26 PROCEDURE — 82607 VITAMIN B-12: CPT

## 2024-01-26 PROCEDURE — 80061 LIPID PANEL: CPT

## 2024-01-26 PROCEDURE — 84443 ASSAY THYROID STIM HORMONE: CPT

## 2024-01-26 PROCEDURE — 82306 VITAMIN D 25 HYDROXY: CPT

## 2024-01-26 PROCEDURE — 3008F BODY MASS INDEX DOCD: CPT | Performed by: FAMILY MEDICINE

## 2024-01-26 PROCEDURE — 3078F DIAST BP <80 MM HG: CPT | Performed by: FAMILY MEDICINE

## 2024-01-26 PROCEDURE — 80053 COMPREHEN METABOLIC PANEL: CPT

## 2024-01-26 NOTE — PROGRESS NOTES
Chino Osuna  12/17/1980    Chief Complaint   Patient presents with    [facility]     TA RM1       HPI:   Chino Osuna is a 43 year old male who presents to [facility] and for CPE. He is a retired . He lives a clean lifestyle with a low processed food diet and is active in his job as a  and exercises. He tracks his sleep and tries to manage stress. He is in the process of starting a new business. He is , and his wife practices functional medicine. He has one son. 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 He does endorse some dry skin and hair loss and has also had some decrease in his erectile stamina.     No current outpatient medications on file.      Allergies   Allergen Reactions    Ketorolac Tromethamine OTHER (SEE COMMENTS)     Veins turn and pink color to skin when administered IM. Per patient states he was given toradol and demeral once and had a reaction but never knew which one was the one that caused reaction.     Meperidine OTHER (SEE COMMENTS)      History reviewed. No pertinent past medical history.   There is no problem list on file for this patient.     Past Surgical History:   Procedure Laterality Date    ORTHOPEDIC - EXTERNAL      multiple shoulder and knee surgery    VASECTOMY        Family History   Problem Relation Age of Onset    Thyroid disease Mother     Hypertension Mother     High Cholesterol Mother     Other (rheumatic fever) Mother     Other (mitral prolapse) Mother     Heart Attack Father 61    Other (tia) Father     Hypertension Maternal Grandmother     Depression Maternal Grandmother     Other (diverticulosis) Maternal Grandmother     Other (gi hemorrhage) Maternal Grandmother     Alcohol and Other Disorders Associated Maternal Grandfather     Heart Attack Maternal Grandfather 56    Stroke Maternal Grandfather 81    Dementia Maternal Grandfather     Other (rhe) Maternal Grandfather     Other (rheumatoid  arthritis) Maternal Grandfather     Other (CAD) Maternal Grandfather     Diabetes Paternal Grandmother     Heart Attack Paternal Grandmother 67    Other (CAD) Paternal Grandmother     Other (gastric carcinoma) Paternal Grandfather 48      Social History     Socioeconomic History    Marital status:    Tobacco Use    Smoking status: Never    Smokeless tobacco: Never   Vaping Use    Vaping Use: Never used   Substance and Sexual Activity    Alcohol use: No         REVIEW OF SYSTEMS:   GENERAL: feels well otherwise  SKIN: no rashes  EYES: no vision changes  HEENT: not congested  LUNGS: no dyspnea  CARDIOVASCULAR: no angina  GI: no GERD symptoms     EXAM:   /64   Pulse 64   Temp 97.5 °F (36.4 °C) (Temporal)   Resp 17   Ht 6' (1.829 m)   Wt 185 lb (83.9 kg)   SpO2 96%   BMI 25.09 kg/m²   GENERAL: Well developed, well nourished,in no apparent distress  SKIN: No rash  EYES: PERRLA, EOMI, conjunctiva are clear  HEENT: atraumatic, normocephalic,ears and throat are clear  NECK: supple,no adenopathy,no bruits  LUNGS: clear to auscultation  CARDIO: RRR without murmur  GI: good BS's,no masses, HSM or tenderness    ASSESSMENT AND PLAN:   Chino Osuna is a 43 year old male who presents for CPE    Wellness examination  Discussed age appropriate health and wellness  - Comp Metabolic Panel (14); Future  - CBC With Differential With Platelet; Future  - Lipid Panel; Future  - TSH W Reflex To Free T4; Future  - Vitamin D, 25-Hydroxy; Future  - Vitamin B12; Future    Gastroesophageal reflux disease, unspecified whether esophagitis present  Family history of colon cancer  Following regularly with GI. Colonoscopy UTD. GERD controlled with dietary optimization    Dry skin  Erectile concern  Vitamin Screening  Continue emphasis on healthy diet and regular exercise will check vitamin labs and testosterone as below.   - Testosterone Total; Future  - Vitamin D, 25-Hydroxy; Future  - Vitamin B12; Future      Return in 1  year (on 1/26/2025).  There are no Patient Instructions on file for this visit.    All questions were answered and the patient agrees with the plan.     Thank you,  David Saucedo MD

## 2024-11-08 ENCOUNTER — TELEPHONE (OUTPATIENT)
Dept: INTERNAL MEDICINE CLINIC | Facility: CLINIC | Age: 44
End: 2024-11-08

## 2024-11-08 DIAGNOSIS — Z13.29 SCREENING FOR ENDOCRINE, NUTRITIONAL, METABOLIC AND IMMUNITY DISORDER: ICD-10-CM

## 2024-11-08 DIAGNOSIS — Z13.0 SCREENING FOR DEFICIENCY ANEMIA: ICD-10-CM

## 2024-11-08 DIAGNOSIS — Z13.220 LIPID SCREENING: Primary | ICD-10-CM

## 2024-11-08 DIAGNOSIS — Z13.0 SCREENING FOR ENDOCRINE, NUTRITIONAL, METABOLIC AND IMMUNITY DISORDER: ICD-10-CM

## 2024-11-08 DIAGNOSIS — Z13.228 SCREENING FOR ENDOCRINE, NUTRITIONAL, METABOLIC AND IMMUNITY DISORDER: ICD-10-CM

## 2024-11-08 DIAGNOSIS — Z13.21 SCREENING FOR ENDOCRINE, NUTRITIONAL, METABOLIC AND IMMUNITY DISORDER: ICD-10-CM

## 2024-11-08 DIAGNOSIS — Z00.00 WELLNESS EXAMINATION: ICD-10-CM

## 2024-11-08 DIAGNOSIS — Z13.29 THYROID DISORDER SCREEN: ICD-10-CM

## 2024-11-08 NOTE — TELEPHONE ENCOUNTER
Patient called request labs prior to their annual physical.  Annual physical scheduled for 11/22/24.   Please order labs. Patient preferred lab is [facility].  Patient informed request was sent to clinical team.  Patient informed to fast for labs.  No callback required.

## 2024-11-22 ENCOUNTER — OFFICE VISIT (OUTPATIENT)
Dept: INTERNAL MEDICINE CLINIC | Facility: CLINIC | Age: 44
End: 2024-11-22
Payer: COMMERCIAL

## 2024-11-22 VITALS
HEIGHT: 72 IN | WEIGHT: 191 LBS | SYSTOLIC BLOOD PRESSURE: 104 MMHG | DIASTOLIC BLOOD PRESSURE: 68 MMHG | BODY MASS INDEX: 25.87 KG/M2 | HEART RATE: 67 BPM | RESPIRATION RATE: 16 BRPM | OXYGEN SATURATION: 98 %

## 2024-11-22 DIAGNOSIS — K21.9 GASTROESOPHAGEAL REFLUX DISEASE, UNSPECIFIED WHETHER ESOPHAGITIS PRESENT: ICD-10-CM

## 2024-11-22 DIAGNOSIS — R10.9 RIGHT FLANK PAIN: ICD-10-CM

## 2024-11-22 DIAGNOSIS — N20.1 URETEROLITHIASIS: Primary | ICD-10-CM

## 2024-11-22 DIAGNOSIS — K83.8 BILIARY SLUDGE: ICD-10-CM

## 2024-11-22 PROCEDURE — 99214 OFFICE O/P EST MOD 30 MIN: CPT | Performed by: FAMILY MEDICINE

## 2024-11-22 PROCEDURE — 3074F SYST BP LT 130 MM HG: CPT | Performed by: FAMILY MEDICINE

## 2024-11-22 PROCEDURE — 3078F DIAST BP <80 MM HG: CPT | Performed by: FAMILY MEDICINE

## 2024-11-22 PROCEDURE — 3008F BODY MASS INDEX DOCD: CPT | Performed by: FAMILY MEDICINE

## 2024-11-22 NOTE — PROGRESS NOTES
Chino Osuna  12/17/1980    Chief Complaint   Patient presents with    Physical     Pt presents for an annual physical.   Pt has active bloodwork not done  Pt is due for the following vaccines: FLU.       HPI:   Chino Osuna is a 43 year old male who presents for follow-up. He has been working a lot and has been struggling with his wife's health struggles. He had a recent kidney stone and R flank pain. Had evaluation with urology and XR showed 3 mm stone. Has been hydrating and may have passed the stone as his pain is improving. He also had follow-up evaluation with his gastroenterologist and has US that showed some sludge but generally has been asymptomatic.     No current outpatient medications on file.      Allergies[1]   No past medical history on file.   There is no problem list on file for this patient.     Past Surgical History:   Procedure Laterality Date    Orthopedic - external      multiple shoulder and knee surgery    Vasectomy        Family History   Problem Relation Age of Onset    Thyroid disease Mother     Hypertension Mother     High Cholesterol Mother     Other (rheumatic fever) Mother     Other (mitral prolapse) Mother     Heart Attack Father 61    Other (tia) Father     Hypertension Maternal Grandmother     Depression Maternal Grandmother     Other (diverticulosis) Maternal Grandmother     Other (gi hemorrhage) Maternal Grandmother     Alcohol and Other Disorders Associated Maternal Grandfather     Heart Attack Maternal Grandfather 56    Stroke Maternal Grandfather 81    Dementia Maternal Grandfather     Other (rhe) Maternal Grandfather     Other (rheumatoid arthritis) Maternal Grandfather     Other (CAD) Maternal Grandfather     Diabetes Paternal Grandmother     Heart Attack Paternal Grandmother 67    Other (CAD) Paternal Grandmother     Other (gastric carcinoma) Paternal Grandfather 48      Social History     Socioeconomic History    Marital status:    Tobacco Use     Smoking status: Never    Smokeless tobacco: Never   Vaping Use    Vaping status: Never Used   Substance and Sexual Activity    Alcohol use: No         REVIEW OF SYSTEMS:   GENERAL: no recent illness, no new CP or dyspnea. See HPI    EXAM:   /68   Pulse 67   Resp 16   Ht 6' (1.829 m)   Wt 191 lb (86.6 kg)   SpO2 98%   BMI 25.90 kg/m²   GENERAL: Well developed, well nourished,in no apparent distress  SKIN: No rash  HEENT: atraumatic, normocephalic  LUNGS: normal work of breathing  CARDIO: Regular rate    ASSESSMENT AND PLAN:   Chino Osuna is a 43 year old male who presents for follow-up    Ureterolithiasis  Right flank pain  Has upcoming urology follow-up. May have already passed the stone as his pain has improved, but was not straining urine.     GERD  Biliary sludge  Following with GI. Monitoring for symptoms biliary colic or worsening reflux.         All questions were answered and the patient agrees with the plan.     Thank you,  David Saucedo MD         [1]   Allergies  Allergen Reactions    Gluten Meal OTHER (SEE COMMENTS)    Ketorolac Tromethamine OTHER (SEE COMMENTS)     Veins turn and pink color to skin when administered IM. Per patient states he was given toradol and demeral once and had a reaction but never knew which one was the one that caused reaction.     Meperidine OTHER (SEE COMMENTS)    Soy Allergy OTHER (SEE COMMENTS)    Wheat OTHER (SEE COMMENTS)